# Patient Record
(demographics unavailable — no encounter records)

---

## 2020-12-04 NOTE — EMERGENCY DEPARTMENT REPORT
ED General Adult HPI





- General


Chief complaint: High BP


Stated complaint: SEVERE HEADACHE


Time Seen by Provider: 20 17:02


Source: patient


Mode of arrival: Ambulatory


Limitations: No Limitations





- History of Present Illness


Initial comments: 





Patient is a 50-year-old female who is here complaining of mild headache for the

last 2 days.  She states she believes her blood pressure is elevated.  She has 

been out of her blood pressure medications for 1 month.  She has a bottle of the

exact same medication but is 6  and she did not want to take it.  She was

sent to [facility] today from her job and blood pressure was elevated.  She was 

given 0.1 mg of clonidine with no improvement she was sent to the emergency 

department.  She has no shortness of breath chest pain or focal neurological 

deficits.





- Related Data


                                  Previous Rx's











 Medication  Instructions  Recorded  Last Taken  Type


 


Valsartan/Hydrochlorothiazide 1 each PO DAILY #30 tablet 20 Unknown Rx





[Valsartan-Hctz 160-25 mg Tab]    











                                    Allergies











Allergy/AdvReac Type Severity Reaction Status Date / Time


 


No Known Allergies Allergy   Verified 20 16:57














ED Review of Systems


ROS: 


Stated complaint: SEVERE HEADACHE


Other details as noted in HPI





Comment: All other systems reviewed and negative





ED Past Medical Hx





- Past Medical History


Hx Hypertension: Yes





- Surgical History


Past Surgical History?: No





- Medications


Home Medications: 


                                Home Medications











 Medication  Instructions  Recorded  Confirmed  Last Taken  Type


 


Valsartan/Hydrochlorothiazide 1 each PO DAILY #30 tablet 20  Unknown Rx





[Valsartan-Hctz 160-25 mg Tab]     














ED Physical Exam





- General


Limitations: No Limitations


General appearance: alert, in no apparent distress





- Head


Head exam: Present: atraumatic, normocephalic





- Eye


Eye exam: Present: normal appearance





- ENT


ENT exam: Present: mucous membranes moist





- Neck


Neck exam: Present: normal inspection





- Respiratory


Respiratory exam: Present: normal lung sounds bilaterally.  Absent: respiratory 

distress, wheezes, rales, rhonchi





- Cardiovascular


Cardiovascular Exam: Present: regular rate, normal rhythm, normal heart sounds. 

Absent: systolic murmur, diastolic murmur, rubs, gallop





- GI/Abdominal


GI/Abdominal exam: Present: soft, normal bowel sounds.  Absent: distended, 

tenderness, guarding, rebound





- Extremities Exam


Extremities exam: Present: normal inspection





- Back Exam


Back exam: Present: normal inspection





- Neurological Exam


Neurological exam: Present: alert, oriented X3





- Psychiatric


Psychiatric exam: Present: normal affect, normal mood





- Skin


Skin exam: Present: warm, dry, intact, normal color.  Absent: rash





ED Course





                                   Vital Signs











  20





  17:00 18:13 18:57


 


Temperature 98.5 F  


 


Pulse Rate 100 H 84 


 


Respiratory 16  





Rate   


 


Blood Pressure 206/122  183/101


 


Blood Pressure  195/122 





[Left]   


 


O2 Sat by Pulse 95 98 





Oximetry   














ED Medical Decision Making





- Lab Data


Result diagrams: 


                                 20 17:16





                                 20 17:16








                                   Lab Results











  20 Range/Units





  17:16 17:16 


 


WBC  4.6   (4.5-11.0)  K/mm3


 


RBC  3.95   (3.65-5.03)  M/mm3


 


Hgb  12.7   (10.1-14.3)  gm/dl


 


Hct  36.9   (30.3-42.9)  %


 


MCV  94   (79-97)  fl


 


MCH  32   (28-32)  pg


 


MCHC  34   (30-34)  %


 


RDW  12.9 L   (13.2-15.2)  %


 


Plt Count  191   (140-440)  K/mm3


 


Lymph % (Auto)  34.8   (13.4-35.0)  %


 


Mono % (Auto)  6.4   (0.0-7.3)  %


 


Eos % (Auto)  1.0   (0.0-4.3)  %


 


Baso % (Auto)  0.7   (0.0-1.8)  %


 


Lymph # (Auto)  1.6   (1.2-5.4)  K/mm3


 


Mono # (Auto)  0.3   (0.0-0.8)  K/mm3


 


Eos # (Auto)  0.0   (0.0-0.4)  K/mm3


 


Baso # (Auto)  0.0   (0.0-0.1)  K/mm3


 


Seg Neutrophils %  57.1   (40.0-70.0)  %


 


Seg Neutrophils #  2.6   (1.8-7.7)  K/mm3


 


Sodium   142  (137-145)  mmol/L


 


Potassium   3.6  (3.6-5.0)  mmol/L


 


Chloride   103.7  ()  mmol/L


 


Carbon Dioxide   29  (22-30)  mmol/L


 


Anion Gap   13  mmol/L


 


BUN   10  (7-17)  mg/dL


 


Creatinine   0.7  (0.6-1.2)  mg/dL


 


Estimated GFR   > 60  ml/min


 


BUN/Creatinine Ratio   14  %


 


Glucose   114 H  ()  mg/dL


 


Calcium   9.4  (8.4-10.2)  mg/dL


 


Total Bilirubin   0.30  (0.1-1.2)  mg/dL


 


AST   25  (5-40)  units/L


 


ALT   30  (7-56)  units/L


 


Alkaline Phosphatase   70  ()  units/L


 


Total Protein   7.7  (6.3-8.2)  g/dL


 


Albumin   4.5  (3.9-5)  g/dL


 


Albumin/Globulin Ratio   1.4  %














- Medical Decision Making





Patient is not showing any signs of endorgan damage at this time.  She was given

a dose of metoprolol and will be restarted back on her blood prescription.  

Patient was taken valsartan hydrochlorothiazide 160/12.5.  Patient states that 

she was hoping to have some better control of her blood pressure and will up the

dose to the 160/25 mg dose.


Critical care attestation.: 


If time is entered above; I have spent that time in minutes in the direct care 

of this critically ill patient, excluding procedure time.








ED Disposition


Clinical Impression: 


 Hypertensive urgency, Medical non-compliance





Disposition: DC-01 TO HOME OR SELFCARE


Is pt being admited?: No


Does the pt Need Aspirin: No


Condition: Stable


Instructions:  Preventing Hypertension, Hypertension, Adult, Easy-to-Read


Prescriptions: 


Valsartan/Hydrochlorothiazide [Valsartan-Hctz 160-25 mg Tab] 1 each PO DAILY #30

tablet


Time of Disposition: 19:19

## 2020-12-04 NOTE — EVENT NOTE
ED Screening Note


Date of service: 12/04/20


Time: 17:02


ED Screening Note: 





Patient complains of headache x yesterday


Was seen at [facility] today and given 0.1 of clonidine


/122 here


States headache is posterior


Denies vision changes, dizziness, numbness/tingling/weakness, or difficulty with

speech





This initial assessment/diagnostic orders/clinical plan/treatment(s) is/are 

subject to change based on patients health status, clinical progression and re-

assessment by fellow clinical providers in the ED. Further treatment and workup 

at subsequent clinical providers discretion. Patient/guardian urged not to elope

from the ED as their condition may be serious if not clinically assessed and 

managed. 





Initial orders include: 


Labs